# Patient Record
Sex: FEMALE | Race: WHITE | NOT HISPANIC OR LATINO | Employment: FULL TIME | URBAN - METROPOLITAN AREA
[De-identification: names, ages, dates, MRNs, and addresses within clinical notes are randomized per-mention and may not be internally consistent; named-entity substitution may affect disease eponyms.]

---

## 2019-02-27 ENCOUNTER — OFFICE VISIT (OUTPATIENT)
Dept: URGENT CARE | Facility: CLINIC | Age: 24
End: 2019-02-27
Payer: COMMERCIAL

## 2019-02-27 VITALS
OXYGEN SATURATION: 99 % | TEMPERATURE: 98.2 F | RESPIRATION RATE: 16 BRPM | HEIGHT: 69 IN | SYSTOLIC BLOOD PRESSURE: 139 MMHG | BODY MASS INDEX: 28.14 KG/M2 | HEART RATE: 109 BPM | WEIGHT: 190 LBS | DIASTOLIC BLOOD PRESSURE: 92 MMHG

## 2019-02-27 DIAGNOSIS — R51.9 ACUTE NONINTRACTABLE HEADACHE, UNSPECIFIED HEADACHE TYPE: Primary | ICD-10-CM

## 2019-02-27 PROCEDURE — G0382 LEV 3 HOSP TYPE B ED VISIT: HCPCS | Performed by: EMERGENCY MEDICINE

## 2019-02-27 RX ORDER — SUMATRIPTAN 50 MG/1
50 TABLET, FILM COATED ORAL ONCE AS NEEDED
Qty: 9 TABLET | Refills: 0 | Status: SHIPPED | OUTPATIENT
Start: 2019-02-27

## 2019-02-27 RX ORDER — MEDROXYPROGESTERONE ACETATE 150 MG/ML
150 INJECTION, SUSPENSION INTRAMUSCULAR
COMMUNITY

## 2019-02-27 RX ORDER — ONDANSETRON 4 MG/1
4 TABLET, FILM COATED ORAL EVERY 8 HOURS PRN
Qty: 20 TABLET | Refills: 0 | Status: SHIPPED | OUTPATIENT
Start: 2019-02-27

## 2019-02-27 NOTE — PATIENT INSTRUCTIONS
Migraine Headache   WHAT YOU NEED TO KNOW:   A migraine is a severe headache  The pain can be so severe that it interferes with your daily activities  A migraine can last a few hours up to several days  The exact cause of migraines is not known  DISCHARGE INSTRUCTIONS:   Return to the emergency department if:   · You have a headache that seems different or much worse than your usual migraine headache  · You have a severe headache with a fever or a stiff neck  · You have new problems with speech, vision, balance, or movement  · You feel like you are going to faint, you become confused, or you have a seizure  Contact your healthcare provider or neurologist if:   · Your migraines interfere with your daily activities  · Your medicines or treatments stop working  · You have questions or concerns about your condition or care  Medicines: You may need any of the following  Take medicine as soon as you feel a migraine begin  · Prescription pain medicine  may be given  Do not wait until the pain is severe before you take your medicine  · Migraine medicines  are used to help prevent a migraine or stop it once it starts  · Antinausea medicine  may be given to calm your stomach and to help prevent vomiting  This medicine can also help relieve pain  · Take your medicine as directed  Contact your healthcare provider if you think your medicine is not helping or if you have side effects  Tell him or her if you are allergic to any medicine  Keep a list of the medicines, vitamins, and herbs you take  Include the amounts, and when and why you take them  Bring the list or the pill bottles to follow-up visits  Carry your medicine list with you in case of an emergency  Manage your symptoms:   · Rest in a dark, quiet room  This will help decrease your pain  Sleep may also help relieve the pain  · Apply ice to decrease pain  Use an ice pack, or put crushed ice in a plastic bag   Cover the ice pack with a towel and place it on your head  Apply ice for 15 to 20 minutes every hour  · Apply heat to decrease pain and muscle spasms  Use a small towel dampened with warm water or a heating pad, or sit in a warm bath  Apply heat on the area for 20 to 30 minutes every 2 hours  You may alternate heat and ice  · Keep a migraine record  Write down when your migraines start and stop  Include your symptoms and what you were doing when a migraine began  Record what you ate or drank for 24 hours before the migraine started  Keep track of what you did to treat your migraine and if it worked  Bring the migraine record with you to visits with your healthcare provider  Follow up with your healthcare provider or neurologist as directed:  Bring your migraine record with you  Write down your questions so you remember to ask them during your visits  Prevent another migraine:   · Do not smoke  Nicotine and other chemicals in cigarettes and cigars can trigger a migraine or make it worse  Ask your healthcare provider for information if you currently smoke and need help to quit  E-cigarettes or smokeless tobacco still contain nicotine  Talk to your healthcare provider before you use these products  · Do not drink alcohol  Alcohol can trigger a migraine  It can also keep medicines used to treat your migraines from working  · Get regular exercise  Exercise may help prevent migraines  Talk to your healthcare provider about the best exercise plan for you  Try to get at least 30 minutes of exercise on most days  · Manage stress  Stress may trigger a migraine  Learn new ways to relax, such as deep breathing  · Create a sleep schedule  Go to bed and get up at the same times each day  Do not watch television before bed  · Eat regular meals  Include healthy foods such as include fruit, vegetables, whole-grain breads, low-fat dairy products, beans, lean meat, and fish   Do not have food or drinks that trigger your migraines  © 2017 [facility] [facility] Information is for End User's use only and may not be sold, redistributed or otherwise used for commercial purposes  All illustrations and images included in CareNotes® are the copyrighted property of [employer]  or [employer]  The above information is an  only  It is not intended as medical advice for individual conditions or treatments  Talk to your doctor, nurse or pharmacist before following any medical regimen to see if it is safe and effective for you  MIGRAINE  Not every migraine is tied to a trigger  But if yours are, one of the best ways to prevent them is to learn your triggers and do your best to avoid them  For some people, that means saying no to certain foods  Why Does Food Cause Headaches? The exact cause for migraines isnt yet known  But doctors agree that brief changes in your brain activity bring them on  These affect your blood vessels and nerve signals as well  The result: throbbing head pain that can sometimes last for days  Many things can cause migraines, like medicine you take, changes in your hormones, and a lack of sleep  Your diet plays a part, too  In about 10% of people with these headaches, food is a trigger  Foods to [facility]  Some common trigger foods include:   Apples   Bananas   Beans (like broad or juany)   Chocolate   Corn   Citrus fruits   Cultured dairy products (like yogurt and kefir)   Nuts and nut butters   Onions   Tomatoes  Cut Back on Cheese  Tyramine is a natural compound that forms in protein-rich foods as they age  Its also a known trigger for migraines  These cheeses are known to be high in tyramine:  known trigger for migraines   These cheeses are known to be high in tyramine:   Blue cheese   Brie   Cheddar   Feta   Mozzarella   [place]   Parmesan   Swiss cheese  Some processed meats, along with alcoholic drinks and freshly baked yeast bread and cake, contain tyramine as well  Avoid These Additives  Chemicals added to food to enhance their flavor or help them stay fresh longer may bring on a headache:   MSG (monosodium glutamate): The main ingredient in soy sauce and meat tenderizer, MSG can   Chemicals added to food to enhance their flavor or help them stay fresh longer may bring on a headache:   MSG (monosodium glutamate): The main ingredient in soy sauce and meat tenderizer, MSG can spark a migraine within 20 minutes  Its sometimes listed on packaged foods as all natural preservatives or hydrolyzed protein    Nitrates and nitrites: These chemicals are found in many cured and processed meats, like hot dogs, ham, and romero  Once they get into your system, they cause your blood vessels to swell, which can start a headache  Aspartame: Its unclear how this artificial sweetener, which is 150 times sweeter than sugar, causes headaches  More research is needed  Still, you may want to limit how much you use it  Watch What You Drink, Too  You may have heard that red wine causes migraines, but other alcoholic drinks like beer, champagne, and hard liquor can also make your head pound  Certain ingredients in alcohol cause chemicals and blood vessels in your brain to act in an abnormal way  You dont need to spend all night at a bar for this to happen  For some people, one boozy drink can be enough to trigger a headache  Caffeine can cause headaches  But it isnt wise to go cold turkey on your favorite drinks  That could lead to a withdrawal headache  Instead, you may need to limit your caffeine intake to no more than 200 milligrams a day  Thats about one small cup of coffee  Remember, it isnt just hot drinks and some sodas that have caffeine  Chocolate has some, too  Check Your Eating Habits  It isnt just the food you eat that can trigger a migraine  Your eating habits play a role as well    You may get a headache if you:   Dont eat enough   Dont drink enough water   Skip meals  How to Hold Off Migraines  Take these steps to help stave off a migraine after you eat:  Choose better food  Eat as much wholesome, fresh food, like fruits and vegetables, as you can  Avoid processed and packaged foods  They might contain ingredients that will trigger a headache

## 2019-02-27 NOTE — PROGRESS NOTES
[facility]        NAME: Ignacio León is a 21 y o  female  : 1995    MRN: [id number]  DATE: 2019  TIME: 2:44 PM    Assessment and Plan   Acute nonintractable headache, unspecified headache type [R51]  1  Acute nonintractable headache, unspecified headache type  SUMAtriptan (IMITREX) 50 mg tablet    ondansetron (ZOFRAN) 4 mg tablet    Ambulatory referral to Neurology         Patient Instructions     Patient Instructions   Migraine Headache   WHAT YOU NEED TO KNOW:   A migraine is a severe headache  The pain can be so severe that it interferes with your daily activities  A migraine can last a few hours up to several days  The exact cause of migraines is not known  DISCHARGE INSTRUCTIONS:   Return to the emergency department if:   · You have a headache that seems different or much worse than your usual migraine headache  · You have a severe headache with a fever or a stiff neck  · You have new problems with speech, vision, balance, or movement  · You feel like you are going to faint, you become confused, or you have a seizure  Contact your healthcare provider or neurologist if:   · Your migraines interfere with your daily activities  · Your medicines or treatments stop working  · You have questions or concerns about your condition or care  Medicines: You may need any of the following  Take medicine as soon as you feel a migraine begin  · Prescription pain medicine  may be given  Do not wait until the pain is severe before you take your medicine  · Migraine medicines  are used to help prevent a migraine or stop it once it starts  · Antinausea medicine  may be given to calm your stomach and to help prevent vomiting  This medicine can also help relieve pain  · Take your medicine as directed  Contact your healthcare provider if you think your medicine is not helping or if you have side effects  Tell him or her if you are allergic to any medicine   Keep a list of the medicines, vitamins, and herbs you take  Include the amounts, and when and why you take them  Bring the list or the pill bottles to follow-up visits  Carry your medicine list with you in case of an emergency  Manage your symptoms:   · Rest in a dark, quiet room  This will help decrease your pain  Sleep may also help relieve the pain  · Apply ice to decrease pain  Use an ice pack, or put crushed ice in a plastic bag  Cover the ice pack with a towel and place it on your head  Apply ice for 15 to 20 minutes every hour  · Apply heat to decrease pain and muscle spasms  Use a small towel dampened with warm water or a heating pad, or sit in a warm bath  Apply heat on the area for 20 to 30 minutes every 2 hours  You may alternate heat and ice  · Keep a migraine record  Write down when your migraines start and stop  Include your symptoms and what you were doing when a migraine began  Record what you ate or drank for 24 hours before the migraine started  Keep track of what you did to treat your migraine and if it worked  Bring the migraine record with you to visits with your healthcare provider  Follow up with your healthcare provider or neurologist as directed:  Bring your migraine record with you  Write down your questions so you remember to ask them during your visits  Prevent another migraine:   · Do not smoke  Nicotine and other chemicals in cigarettes and cigars can trigger a migraine or make it worse  Ask your healthcare provider for information if you currently smoke and need help to quit  E-cigarettes or smokeless tobacco still contain nicotine  Talk to your healthcare provider before you use these products  · Do not drink alcohol  Alcohol can trigger a migraine  It can also keep medicines used to treat your migraines from working  · Get regular exercise  Exercise may help prevent migraines  Talk to your healthcare provider about the best exercise plan for you   Try to get at least 30 minutes of exercise on most days  · Manage stress  Stress may trigger a migraine  Learn new ways to relax, such as deep breathing  · Create a sleep schedule  Go to bed and get up at the same times each day  Do not watch television before bed  · Eat regular meals  Include healthy foods such as include fruit, vegetables, whole-grain breads, low-fat dairy products, beans, lean meat, and fish  Do not have food or drinks that trigger your migraines  © 2017 [address] [employer] Information is for End User's use only and may not be sold, redistributed or otherwise used for commercial purposes  All illustrations and images included in CareNotes® are the copyrighted property of [employer]  or [employer]  The above information is an  only  It is not intended as medical advice for individual conditions or treatments  Talk to your doctor, nurse or pharmacist before following any medical regimen to see if it is safe and effective for you  MIGRAINE  Not every migraine is tied to a trigger  But if yours are, one of the best ways to prevent them is to learn your triggers and do your best to avoid them  For some people, that means saying no to certain foods  Why Does Food Cause Headaches? The exact cause for migraines isnt yet known  But doctors agree that brief changes in your brain activity bring them on  These affect your blood vessels and nerve signals as well  The result: throbbing head pain that can sometimes last for days  Many things can cause migraines, like medicine you take, changes in your hormones, and a lack of sleep  Your diet plays a part, too  In about 10% of people with these headaches, food is a trigger    Foods to [facility]  Some common trigger foods include:   Apples   Bananas   Beans (like broad or juany)   Chocolate   Corn   Citrus fruits   Cultured dairy products (like yogurt and kefir)   Nuts and nut butters   Onions   Tomatoes  Cut Back on Cheese  Tyramine is a natural compound that forms in protein-rich foods as they age  Its also a known trigger for migraines  These cheeses are known to be high in tyramine:  known trigger for migraines  These cheeses are known to be high in tyramine:   Blue cheese   Brie   Cheddar   Feta   Mozzarella   [place]   Parmesan   Swiss cheese  Some processed meats, along with alcoholic drinks and freshly baked yeast bread and cake, contain tyramine as well  Avoid These Additives  Chemicals added to food to enhance their flavor or help them stay fresh longer may bring on a headache:   MSG (monosodium glutamate): The main ingredient in soy sauce and meat tenderizer, MSG can   Chemicals added to food to enhance their flavor or help them stay fresh longer may bring on a headache:   MSG (monosodium glutamate): The main ingredient in soy sauce and meat tenderizer, MSG can spark a migraine within 20 minutes  Its sometimes listed on packaged foods as all natural preservatives or hydrolyzed protein    Nitrates and nitrites: These chemicals are found in many cured and processed meats, like hot dogs, ham, and romero  Once they get into your system, they cause your blood vessels to swell, which can start a headache  Aspartame: Its unclear how this artificial sweetener, which is 150 times sweeter than sugar, causes headaches  More research is needed  Still, you may want to limit how much you use it  Watch What You Drink, Too  You may have heard that red wine causes migraines, but other alcoholic drinks like beer, champagne, and hard liquor can also make your head pound  Certain ingredients in alcohol cause chemicals and blood vessels in your brain to act in an abnormal way  You dont need to spend all night at a bar for this to happen  For some people, one boozy drink can be enough to trigger a headache  Caffeine can cause headaches   But it isnt wise to go cold turkey on your favorite drinks  That could lead to a withdrawal headache  Instead, you may need to limit your caffeine intake to no more than 200 milligrams a day  Thats about one small cup of coffee  Remember, it isnt just hot drinks and some sodas that have caffeine  Chocolate has some, too  Check Your Eating Habits  It isnt just the food you eat that can trigger a migraine  Your eating habits play a role as well  You may get a headache if you:   Dont eat enough   Dont drink enough water   Skip meals  How to Hold Off Migraines  Take these steps to help stave off a migraine after you eat:  Choose better food  Eat as much wholesome, fresh food, like fruits and vegetables, as you can  Avoid processed and packaged foods  They might contain ingredients that will trigger a headache  Follow up with PCP in 3-5 days  Proceed to  ER if symptoms worsen  Chief Complaint     Chief Complaint   Patient presents with    Headache     began with migraine headache 3 days ago, increased in severity during night associated with vomiting         History of Present Illness       Patient complains of throbbing retro-orbital bilateral headache for the past 3 days associated with nausea, of 1 episode of vomiting, photophobia and phonophobia  She admits identical symptoms in the past diagnosed as migraine but has never had any type of imaging study done  She has never seen a neurologist   She has never received any injections for her headaches  She has only ever taken over-the-counter medicines  She denies recent head trauma  She denies stiff neck  She denies fever or chills  Review of Systems   Review of Systems   Constitutional: Negative for activity change, chills, fatigue and fever  HENT: Negative for congestion and sore throat  Eyes: Positive for photophobia  Respiratory: Negative for shortness of breath  Gastrointestinal: Positive for nausea and vomiting  Negative for abdominal pain and blood in stool  Endocrine: Negative for cold intolerance, heat intolerance, polydipsia, polyphagia and polyuria  Genitourinary: Negative for hematuria  Musculoskeletal: Negative for arthralgias, back pain, joint swelling, myalgias, neck pain and neck stiffness  Skin: Negative for color change, rash and wound  Neurological: Positive for light-headedness and headaches  Negative for dizziness, syncope and weakness  Current Medications       Current Outpatient Medications:     medroxyPROGESTERone (DEPO-PROVERA) 150 mg/mL injection, Inject 150 mg into a muscle every 3 (three) months, Disp: , Rfl:     ondansetron (ZOFRAN) 4 mg tablet, Take 1 tablet (4 mg total) by mouth every 8 (eight) hours as needed for nausea or vomiting, Disp: 20 tablet, Rfl: 0    SUMAtriptan (IMITREX) 50 mg tablet, Take 1 tablet (50 mg total) by mouth once as needed for migraine for up to 1 dose, Disp: 9 tablet, Rfl: 0    Current Allergies     Allergies as of 02/27/2019    (No Known Allergies)            The following portions of the patient's history were reviewed and updated as appropriate: allergies, current medications, past family history, past medical history, past social history, past surgical history and problem list      Past Medical History:   Diagnosis Date    Anxiety     Migraine        Past Surgical History:   Procedure Laterality Date    EAR SURGERY      WISDOM TOOTH EXTRACTION         Family History   Problem Relation Age of Onset    Hypertension Mother     Anxiety disorder Mother     Hypertension Father          Medications have been verified  Objective   /92   Pulse (!) 109   Temp 98 2 °F (36 8 °C) (Tympanic)   Resp 16   Ht 5' 9" (1 753 m)   Wt 86 2 kg (190 lb)   SpO2 99%   BMI 28 06 kg/m²        Physical Exam     Physical Exam   Constitutional: She is oriented to person, place, and time  She appears well-developed and well-nourished  No distress  HENT:   Head: Normocephalic and atraumatic     Right Ear: Tympanic membrane and external ear normal    Left Ear: Tympanic membrane and external ear normal    Nose: Mucosal edema present  Mouth/Throat: Posterior oropharyngeal erythema present  No oropharyngeal exudate or tonsillar abscesses  Eyes: Pupils are equal, round, and reactive to light  Conjunctivae are normal    Fundoscopic exam:       The right eye shows no hemorrhage and no papilledema  The left eye shows no hemorrhage and no papilledema  Neck: Neck supple  Cardiovascular: Normal rate and regular rhythm  Pulmonary/Chest: Effort normal    Abdominal: Soft  Bowel sounds are normal    Neurological: She is alert and oriented to person, place, and time  Skin: Skin is warm and dry  Nursing note and vitals reviewed